# Patient Record
Sex: FEMALE
[De-identification: names, ages, dates, MRNs, and addresses within clinical notes are randomized per-mention and may not be internally consistent; named-entity substitution may affect disease eponyms.]

---

## 2017-06-23 PROBLEM — F41.9 ANXIETY: Status: ACTIVE | Noted: 2017-06-23

## 2018-09-03 ENCOUNTER — HOSPITAL (OUTPATIENT)
Dept: OTHER | Age: 48
End: 2018-09-03
Attending: SURGERY

## 2018-10-09 PROCEDURE — 87045 FECES CULTURE AEROBIC BACT: CPT | Performed by: INTERNAL MEDICINE

## 2018-10-09 PROCEDURE — 87272 CRYPTOSPORIDIUM AG IF: CPT | Performed by: INTERNAL MEDICINE

## 2018-10-09 PROCEDURE — 87046 STOOL CULTR AEROBIC BACT EA: CPT | Performed by: INTERNAL MEDICINE

## 2018-10-09 PROCEDURE — 87427 SHIGA-LIKE TOXIN AG IA: CPT | Performed by: INTERNAL MEDICINE

## 2018-10-09 PROCEDURE — 87329 GIARDIA AG IA: CPT | Performed by: INTERNAL MEDICINE

## 2018-10-09 PROCEDURE — 87209 SMEAR COMPLEX STAIN: CPT | Performed by: INTERNAL MEDICINE

## 2018-10-09 PROCEDURE — 87177 OVA AND PARASITES SMEARS: CPT | Performed by: INTERNAL MEDICINE

## 2018-10-09 PROCEDURE — 89055 LEUKOCYTE ASSESSMENT FECAL: CPT | Performed by: INTERNAL MEDICINE

## 2020-08-20 PROBLEM — Z97.5 IUD (INTRAUTERINE DEVICE) IN PLACE: Status: ACTIVE | Noted: 2020-08-20

## 2020-08-20 PROBLEM — I10 ESSENTIAL HYPERTENSION: Status: ACTIVE | Noted: 2020-08-20

## 2021-06-21 PROBLEM — E78.2 MIXED HYPERLIPIDEMIA: Status: ACTIVE | Noted: 2021-06-21

## 2021-06-21 PROBLEM — E55.9 VITAMIN D DEFICIENCY: Status: ACTIVE | Noted: 2021-06-21

## 2022-01-09 ENCOUNTER — HOSPITAL ENCOUNTER (OUTPATIENT)
Age: 52
Discharge: HOME | End: 2022-01-09
Payer: COMMERCIAL

## 2022-01-09 ENCOUNTER — APPOINTMENT (OUTPATIENT)
Dept: GENERAL RADIOLOGY | Age: 52
End: 2022-01-09
Attending: NURSE PRACTITIONER
Payer: COMMERCIAL

## 2022-01-09 VITALS
HEART RATE: 105 BPM | DIASTOLIC BLOOD PRESSURE: 91 MMHG | TEMPERATURE: 96 F | OXYGEN SATURATION: 98 % | RESPIRATION RATE: 20 BRPM | SYSTOLIC BLOOD PRESSURE: 171 MMHG

## 2022-01-09 DIAGNOSIS — S42.91XA CLOSED FRACTURE OF RIGHT SHOULDER, INITIAL ENCOUNTER: Primary | ICD-10-CM

## 2022-01-09 PROCEDURE — 99204 OFFICE O/P NEW MOD 45 MIN: CPT

## 2022-01-09 PROCEDURE — 73030 X-RAY EXAM OF SHOULDER: CPT | Performed by: NURSE PRACTITIONER

## 2022-01-09 PROCEDURE — 99203 OFFICE O/P NEW LOW 30 MIN: CPT

## 2022-01-09 RX ORDER — HYDROCODONE BITARTRATE AND ACETAMINOPHEN 5; 325 MG/1; MG/1
1-2 TABLET ORAL EVERY 6 HOURS PRN
Qty: 10 TABLET | Refills: 0 | Status: SHIPPED | OUTPATIENT
Start: 2022-01-09 | End: 2022-01-14

## 2022-01-09 RX ORDER — HYDROCODONE BITARTRATE AND ACETAMINOPHEN 5; 325 MG/1; MG/1
1-2 TABLET ORAL EVERY 6 HOURS PRN
Qty: 10 TABLET | Refills: 0 | Status: SHIPPED | OUTPATIENT
Start: 2022-01-09 | End: 2022-01-09

## 2022-01-09 NOTE — ED PROVIDER NOTES
Patient Seen in: [facility]      History   Patient presents with:  Arm or Hand Injury    Stated Complaint: Harish Shuck on ice, injured right arm and shoulder    Subjective:   HPI    This is a well-appearing 79-year-old female who presents with a rig General: She is awake. She is not in acute distress. Appearance: Normal appearance. She is not ill-appearing, toxic-appearing or diaphoretic. HENT:      Head: Normocephalic and atraumatic.       Right Ear: Tympanic membrane, ear canal and external ear (CPT=73030)    Result Date: 1/9/2022  CONCLUSION:  Acute impacted fracture involving the right humeral head-neck. Fracture is moderately comminuted and greater tuberosity fracture fragment is demonstrated with approximate 10 mm peripheral displacement.   D every 6 (six) hours as needed for Pain.   Qty: 10 tablet Refills: 0

## 2022-01-10 PROBLEM — S42.291A OTHER CLOSED DISPLACED FRACTURE OF PROXIMAL END OF RIGHT HUMERUS, INITIAL ENCOUNTER: Status: ACTIVE | Noted: 2022-01-10

## 2022-01-12 ENCOUNTER — LAB ENCOUNTER (OUTPATIENT)
Dept: LAB | Age: 52
End: 2022-01-12
Attending: ORTHOPAEDIC SURGERY
Payer: COMMERCIAL

## 2022-01-12 DIAGNOSIS — Z01.818 PREOP TESTING: ICD-10-CM

## 2022-01-13 LAB — SARS-COV-2 RNA RESP QL NAA+PROBE: NOT DETECTED

## 2022-01-15 ENCOUNTER — ANESTHESIA (OUTPATIENT)
Dept: SURGERY | Facility: HOSPITAL | Age: 52
End: 2022-01-15
Payer: COMMERCIAL

## 2022-01-15 ENCOUNTER — ANESTHESIA EVENT (OUTPATIENT)
Dept: SURGERY | Facility: HOSPITAL | Age: 52
End: 2022-01-15
Payer: COMMERCIAL

## 2022-01-15 ENCOUNTER — HOSPITAL ENCOUNTER (OUTPATIENT)
Facility: HOSPITAL | Age: 52
Setting detail: HOSPITAL OUTPATIENT SURGERY
Discharge: HOME | End: 2022-01-15
Attending: ORTHOPAEDIC SURGERY | Admitting: ORTHOPAEDIC SURGERY
Payer: COMMERCIAL

## 2022-01-15 ENCOUNTER — APPOINTMENT (OUTPATIENT)
Dept: GENERAL RADIOLOGY | Facility: HOSPITAL | Age: 52
End: 2022-01-15
Attending: ORTHOPAEDIC SURGERY
Payer: COMMERCIAL

## 2022-01-15 VITALS
OXYGEN SATURATION: 93 % | BODY MASS INDEX: 36.63 KG/M2 | SYSTOLIC BLOOD PRESSURE: 106 MMHG | DIASTOLIC BLOOD PRESSURE: 60 MMHG | TEMPERATURE: 99 F | WEIGHT: 194 LBS | HEART RATE: 66 BPM | RESPIRATION RATE: 16 BRPM | HEIGHT: 61 IN

## 2022-01-15 DIAGNOSIS — M25.511 ACUTE PAIN OF RIGHT SHOULDER: ICD-10-CM

## 2022-01-15 DIAGNOSIS — S42.291A OTHER CLOSED DISPLACED FRACTURE OF PROXIMAL END OF RIGHT HUMERUS, INITIAL ENCOUNTER: ICD-10-CM

## 2022-01-15 DIAGNOSIS — Z01.818 PREOP TESTING: Primary | ICD-10-CM

## 2022-01-15 LAB — B-HCG UR QL: NEGATIVE

## 2022-01-15 PROCEDURE — 0PSC04Z REPOSITION RIGHT HUMERAL HEAD WITH INTERNAL FIXATION DEVICE, OPEN APPROACH: ICD-10-PCS | Performed by: ORTHOPAEDIC SURGERY

## 2022-01-15 PROCEDURE — 76942 ECHO GUIDE FOR BIOPSY: CPT | Performed by: ORTHOPAEDIC SURGERY

## 2022-01-15 PROCEDURE — 76000 FLUOROSCOPY <1 HR PHYS/QHP: CPT | Performed by: ORTHOPAEDIC SURGERY

## 2022-01-15 PROCEDURE — 0LS30ZZ REPOSITION RIGHT UPPER ARM TENDON, OPEN APPROACH: ICD-10-PCS | Performed by: ORTHOPAEDIC SURGERY

## 2022-01-15 PROCEDURE — 81025 URINE PREGNANCY TEST: CPT

## 2022-01-15 PROCEDURE — 64415 NJX AA&/STRD BRCH PLXS IMG: CPT | Performed by: ORTHOPAEDIC SURGERY

## 2022-01-15 DEVICE — IMPLANTABLE DEVICE: Type: IMPLANTABLE DEVICE | Site: HUMERUS | Status: FUNCTIONAL

## 2022-01-15 DEVICE — SCREW CORT LOCK PURPLE 3.5X26: Type: IMPLANTABLE DEVICE | Site: HUMERUS | Status: FUNCTIONAL

## 2022-01-15 DEVICE — SCREW CORT LOCK PURPLE 3.5X30: Type: IMPLANTABLE DEVICE | Site: HUMERUS | Status: FUNCTIONAL

## 2022-01-15 DEVICE — SCREW CORT LOCK PURPLE 3.5X28: Type: IMPLANTABLE DEVICE | Site: HUMERUS | Status: FUNCTIONAL

## 2022-01-15 DEVICE — SCREW CORT LOCK PURPLE 3.5X44: Type: IMPLANTABLE DEVICE | Site: HUMERUS | Status: FUNCTIONAL

## 2022-01-15 DEVICE — SCREW CORT LOCK PURPLE 3.5X36: Type: IMPLANTABLE DEVICE | Site: HUMERUS | Status: FUNCTIONAL

## 2022-01-15 DEVICE — SCREW CORT LOCK PURPLE 3.5X24: Type: IMPLANTABLE DEVICE | Site: HUMERUS | Status: FUNCTIONAL

## 2022-01-15 RX ORDER — HALOPERIDOL 5 MG/ML
0.25 INJECTION INTRAMUSCULAR ONCE AS NEEDED
Status: DISCONTINUED | OUTPATIENT
Start: 2022-01-15 | End: 2022-01-15

## 2022-01-15 RX ORDER — ROCURONIUM BROMIDE 10 MG/ML
INJECTION, SOLUTION INTRAVENOUS AS NEEDED
Status: DISCONTINUED | OUTPATIENT
Start: 2022-01-15 | End: 2022-01-15 | Stop reason: SURG

## 2022-01-15 RX ORDER — HYDROMORPHONE HYDROCHLORIDE 1 MG/ML
0.6 INJECTION, SOLUTION INTRAMUSCULAR; INTRAVENOUS; SUBCUTANEOUS EVERY 5 MIN PRN
Status: DISCONTINUED | OUTPATIENT
Start: 2022-01-15 | End: 2022-01-15

## 2022-01-15 RX ORDER — CEFAZOLIN SODIUM/WATER 2 G/20 ML
2 SYRINGE (ML) INTRAVENOUS ONCE
Status: DISCONTINUED | OUTPATIENT
Start: 2022-01-15 | End: 2022-01-15 | Stop reason: HOSPADM

## 2022-01-15 RX ORDER — NALOXONE HYDROCHLORIDE 0.4 MG/ML
80 INJECTION, SOLUTION INTRAMUSCULAR; INTRAVENOUS; SUBCUTANEOUS AS NEEDED
Status: DISCONTINUED | OUTPATIENT
Start: 2022-01-15 | End: 2022-01-15

## 2022-01-15 RX ORDER — DEXAMETHASONE SODIUM PHOSPHATE 4 MG/ML
VIAL (ML) INJECTION AS NEEDED
Status: DISCONTINUED | OUTPATIENT
Start: 2022-01-15 | End: 2022-01-15 | Stop reason: SURG

## 2022-01-15 RX ORDER — MIDAZOLAM HYDROCHLORIDE 1 MG/ML
INJECTION INTRAMUSCULAR; INTRAVENOUS AS NEEDED
Status: DISCONTINUED | OUTPATIENT
Start: 2022-01-15 | End: 2022-01-15 | Stop reason: SURG

## 2022-01-15 RX ORDER — HYDROCODONE BITARTRATE AND ACETAMINOPHEN 5; 325 MG/1; MG/1
1 TABLET ORAL AS NEEDED
Status: DISCONTINUED | OUTPATIENT
Start: 2022-01-15 | End: 2022-01-15

## 2022-01-15 RX ORDER — FAMOTIDINE 20 MG/1
20 TABLET ORAL ONCE
Status: DISCONTINUED | OUTPATIENT
Start: 2022-01-15 | End: 2022-01-15 | Stop reason: HOSPADM

## 2022-01-15 RX ORDER — HYDROMORPHONE HYDROCHLORIDE 1 MG/ML
0.4 INJECTION, SOLUTION INTRAMUSCULAR; INTRAVENOUS; SUBCUTANEOUS EVERY 5 MIN PRN
Status: DISCONTINUED | OUTPATIENT
Start: 2022-01-15 | End: 2022-01-15

## 2022-01-15 RX ORDER — HYDROCODONE BITARTRATE AND ACETAMINOPHEN 10; 325 MG/1; MG/1
1 TABLET ORAL EVERY 4 HOURS PRN
Qty: 20 TABLET | Refills: 0 | Status: SHIPPED | OUTPATIENT
Start: 2022-01-15

## 2022-01-15 RX ORDER — MORPHINE SULFATE 10 MG/ML
6 INJECTION, SOLUTION INTRAMUSCULAR; INTRAVENOUS EVERY 10 MIN PRN
Status: DISCONTINUED | OUTPATIENT
Start: 2022-01-15 | End: 2022-01-15

## 2022-01-15 RX ORDER — HYDROCODONE BITARTRATE AND ACETAMINOPHEN 5; 325 MG/1; MG/1
2 TABLET ORAL AS NEEDED
Status: DISCONTINUED | OUTPATIENT
Start: 2022-01-15 | End: 2022-01-15

## 2022-01-15 RX ORDER — SODIUM CHLORIDE, SODIUM LACTATE, POTASSIUM CHLORIDE, CALCIUM CHLORIDE 600; 310; 30; 20 MG/100ML; MG/100ML; MG/100ML; MG/100ML
INJECTION, SOLUTION INTRAVENOUS CONTINUOUS
Status: DISCONTINUED | OUTPATIENT
Start: 2022-01-15 | End: 2022-01-15

## 2022-01-15 RX ORDER — PHENYLEPHRINE HCL 10 MG/ML
VIAL (ML) INJECTION AS NEEDED
Status: DISCONTINUED | OUTPATIENT
Start: 2022-01-15 | End: 2022-01-15 | Stop reason: SURG

## 2022-01-15 RX ORDER — TRANEXAMIC ACID 10 MG/ML
INJECTION, SOLUTION INTRAVENOUS AS NEEDED
Status: DISCONTINUED | OUTPATIENT
Start: 2022-01-15 | End: 2022-01-15 | Stop reason: SURG

## 2022-01-15 RX ORDER — KETOROLAC TROMETHAMINE 30 MG/ML
INJECTION, SOLUTION INTRAMUSCULAR; INTRAVENOUS AS NEEDED
Status: DISCONTINUED | OUTPATIENT
Start: 2022-01-15 | End: 2022-01-15 | Stop reason: SURG

## 2022-01-15 RX ORDER — ONDANSETRON 2 MG/ML
4 INJECTION INTRAMUSCULAR; INTRAVENOUS EVERY 6 HOURS PRN
OUTPATIENT
Start: 2022-01-15

## 2022-01-15 RX ORDER — ONDANSETRON 2 MG/ML
4 INJECTION INTRAMUSCULAR; INTRAVENOUS ONCE AS NEEDED
Status: DISCONTINUED | OUTPATIENT
Start: 2022-01-15 | End: 2022-01-15

## 2022-01-15 RX ORDER — ACETAMINOPHEN 500 MG
1000 TABLET ORAL ONCE
Status: COMPLETED | OUTPATIENT
Start: 2022-01-15 | End: 2022-01-15

## 2022-01-15 RX ORDER — ROPIVACAINE HYDROCHLORIDE 5 MG/ML
INJECTION, SOLUTION EPIDURAL; INFILTRATION; PERINEURAL AS NEEDED
Status: DISCONTINUED | OUTPATIENT
Start: 2022-01-15 | End: 2022-01-15 | Stop reason: SURG

## 2022-01-15 RX ORDER — MORPHINE SULFATE 4 MG/ML
2 INJECTION, SOLUTION INTRAMUSCULAR; INTRAVENOUS EVERY 10 MIN PRN
Status: DISCONTINUED | OUTPATIENT
Start: 2022-01-15 | End: 2022-01-15

## 2022-01-15 RX ORDER — LIDOCAINE HYDROCHLORIDE 10 MG/ML
INJECTION, SOLUTION EPIDURAL; INFILTRATION; INTRACAUDAL; PERINEURAL AS NEEDED
Status: DISCONTINUED | OUTPATIENT
Start: 2022-01-15 | End: 2022-01-15 | Stop reason: SURG

## 2022-01-15 RX ORDER — CEFAZOLIN SODIUM/WATER 2 G/20 ML
SYRINGE (ML) INTRAVENOUS AS NEEDED
Status: DISCONTINUED | OUTPATIENT
Start: 2022-01-15 | End: 2022-01-15 | Stop reason: SURG

## 2022-01-15 RX ORDER — METOPROLOL TARTRATE 5 MG/5ML
2.5 INJECTION INTRAVENOUS ONCE
Status: DISCONTINUED | OUTPATIENT
Start: 2022-01-15 | End: 2022-01-15

## 2022-01-15 RX ORDER — DEXAMETHASONE SODIUM PHOSPHATE 10 MG/ML
INJECTION, SOLUTION INTRAMUSCULAR; INTRAVENOUS AS NEEDED
Status: DISCONTINUED | OUTPATIENT
Start: 2022-01-15 | End: 2022-01-15 | Stop reason: SURG

## 2022-01-15 RX ORDER — PROCHLORPERAZINE EDISYLATE 5 MG/ML
5 INJECTION INTRAMUSCULAR; INTRAVENOUS ONCE AS NEEDED
Status: DISCONTINUED | OUTPATIENT
Start: 2022-01-15 | End: 2022-01-15

## 2022-01-15 RX ORDER — HYDROMORPHONE HYDROCHLORIDE 1 MG/ML
0.2 INJECTION, SOLUTION INTRAMUSCULAR; INTRAVENOUS; SUBCUTANEOUS EVERY 5 MIN PRN
Status: DISCONTINUED | OUTPATIENT
Start: 2022-01-15 | End: 2022-01-15

## 2022-01-15 RX ORDER — ONDANSETRON 2 MG/ML
INJECTION INTRAMUSCULAR; INTRAVENOUS AS NEEDED
Status: DISCONTINUED | OUTPATIENT
Start: 2022-01-15 | End: 2022-01-15 | Stop reason: SURG

## 2022-01-15 RX ORDER — MORPHINE SULFATE 4 MG/ML
4 INJECTION, SOLUTION INTRAMUSCULAR; INTRAVENOUS EVERY 10 MIN PRN
Status: DISCONTINUED | OUTPATIENT
Start: 2022-01-15 | End: 2022-01-15

## 2022-01-15 RX ADMIN — PHENYLEPHRINE HCL 100 MCG: 10 MG/ML VIAL (ML) INJECTION at 07:53:00

## 2022-01-15 RX ADMIN — MIDAZOLAM HYDROCHLORIDE 2 MG: 1 INJECTION INTRAMUSCULAR; INTRAVENOUS at 07:19:00

## 2022-01-15 RX ADMIN — ROPIVACAINE HYDROCHLORIDE 30 ML: 5 INJECTION, SOLUTION EPIDURAL; INFILTRATION; PERINEURAL at 07:25:00

## 2022-01-15 RX ADMIN — TRANEXAMIC ACID 1000 MG: 10 INJECTION, SOLUTION INTRAVENOUS at 08:01:00

## 2022-01-15 RX ADMIN — PHENYLEPHRINE HCL 100 MCG: 10 MG/ML VIAL (ML) INJECTION at 08:07:00

## 2022-01-15 RX ADMIN — KETOROLAC TROMETHAMINE 30 MG: 30 INJECTION, SOLUTION INTRAMUSCULAR; INTRAVENOUS at 09:51:00

## 2022-01-15 RX ADMIN — SODIUM CHLORIDE, SODIUM LACTATE, POTASSIUM CHLORIDE, CALCIUM CHLORIDE: 600; 310; 30; 20 INJECTION, SOLUTION INTRAVENOUS at 07:36:00

## 2022-01-15 RX ADMIN — ONDANSETRON 4 MG: 2 INJECTION INTRAMUSCULAR; INTRAVENOUS at 09:47:00

## 2022-01-15 RX ADMIN — DEXAMETHASONE SODIUM PHOSPHATE 10 MG: 10 INJECTION, SOLUTION INTRAMUSCULAR; INTRAVENOUS at 07:25:00

## 2022-01-15 RX ADMIN — LIDOCAINE HYDROCHLORIDE 50 MG: 10 INJECTION, SOLUTION EPIDURAL; INFILTRATION; INTRACAUDAL; PERINEURAL at 07:44:00

## 2022-01-15 RX ADMIN — CEFAZOLIN SODIUM/WATER 2 G: 2 G/20 ML SYRINGE (ML) INTRAVENOUS at 07:49:00

## 2022-01-15 RX ADMIN — DEXAMETHASONE SODIUM PHOSPHATE 4 MG: 4 MG/ML VIAL (ML) INJECTION at 09:42:00

## 2022-01-15 RX ADMIN — SODIUM CHLORIDE, SODIUM LACTATE, POTASSIUM CHLORIDE, CALCIUM CHLORIDE: 600; 310; 30; 20 INJECTION, SOLUTION INTRAVENOUS at 09:56:00

## 2022-01-15 RX ADMIN — ROCURONIUM BROMIDE 50 MG: 10 INJECTION, SOLUTION INTRAVENOUS at 07:44:00

## 2022-01-15 NOTE — H&P
Teresa Corey  1/17/1970  54year old   female  Robin Best MD     HPI:   Patient presents with:  Right Shoulder - Pain: Right shoulder DOI - 1/8/22 when she fell on the ice.        The patient is left-handed.   Date of injury/ onset of symptoms: 1/8/ AGE 63   • Cancer Maternal Grandmother           breast - unknown age   • Cancer Paternal Grandmother           breast - unknown age       Social History    Tobacco Use      Smoking status: Current Some Day Smoker        Packs/day: 0.50        Years: 15.00 shoulder  (primary encounter diagnosis)  Other closed displaced fracture of proximal end of right humerus, initial encounter     The risks, indications, benefits, and procedures of both operative and non-operative treatment were discussed with the patient.

## 2022-01-15 NOTE — ANESTHESIA PROCEDURE NOTES
Peripheral Block  Performed by: Brennan Fuller MD  Authorized by: Brennan Fuller MD       General Information and Staff    Start Time:  1/15/2022 7:18 AM  End Time:  1/15/2022 7:25 AM  Anesthesiologist:  Brennan Fuller MD  Performed by:  Demetria Mason

## 2022-01-15 NOTE — BRIEF OP NOTE
Pre-Operative Diagnosis: Acute pain of right shoulder [M25.511]  Other closed displaced fracture of proximal end of right humerus, initial encounter [S42.291A]     Post-Operative Diagnosis: Acute pain of right shoulder [M25.511]Other closed displaced fract

## 2022-01-15 NOTE — OR PREOP
Earrings and toe rings removed. Unable to remove belly button ring. Tape placed. Endorsed care to [facility] in PACU for pre op block.  Pippa to update anesthesia and MD.

## 2022-01-15 NOTE — ANESTHESIA PREPROCEDURE EVALUATION
Anesthesia PreOp Note    HPI:     Soraida Kennedy is a 54year old female who presents for preoperative consultation requested by: Jasiel Mock MD    Date of Surgery: 1/15/2022    Procedure(s):  RIGHT PROXIMAL HUMEROUS OPEN REDUCTION INTERNAL FIXATION Oral Tab, TAKE 1 TABLET(20 MG) BY MOUTH TWICE DAILY, Disp: 180 tablet, Rfl: 0, 1/15/2022 at 0500  escitalopram 10 MG Oral Tab, Take 0.5 tablets (5 mg total) by mouth daily. , Disp: 45 tablet, Rfl: 1, 1/12/2022 at Unknown time  hydrochlorothiazide 25 MG Oral Used    Vaping Use      Vaping Use: Never used    Substance and Sexual Activity      Alcohol use:  Yes        Alcohol/week: 10.0 standard drinks        Types: 10 Standard drinks or equivalent per week        Comment: social      Drug use: No      Sexual act Anesthesia Evaluation      Airway   Mallampati: II  TM distance: >3 FB  Neck ROM: full  Dental - normal exam     Pulmonary - normal exam   Cardiovascular - normal exam  (+) hypertension,     Neuro/Psych    (+)  anxiety/panic attacks,  depression,

## 2022-01-15 NOTE — ANESTHESIA POSTPROCEDURE EVALUATION
Patient: Himanshu Bell    Procedure Summary     Date: 01/15/22 Room / Location: [address] [place] [address] MAIN OR 04 / [facility] OR    Anesthesia Start: 3324 Anesthesia Stop:     Procedure: RIGHT PROXIMAL HUMEROUS OPEN REDUCTION INTERNAL FIXATION AND BICEPS TENODESIS (Right Upp

## 2022-01-15 NOTE — ANESTHESIA PROCEDURE NOTES
Airway  Date/Time: 1/15/2022 7:46 AM  Urgency: Elective      General Information and Staff    Patient location during procedure: OR  Anesthesiologist: Jude Escobedo MD  Performed: anesthesiologist     Indications and Patient Condition  Indications fo

## 2022-01-17 NOTE — OPERATIVE REPORT
[facility]    PATIENT'S NAME: Teresa Rosa   ATTENDING PHYSICIAN: Justyna Hill MD   OPERATING PHYSICIAN: Justyna Hill MD   PATIENT ACCOUNT#:   [de-identified]    LOCATION:  [facility] [address] PACU [address] [place] [address]  MEDICAL RECORD #:   [id number] was identified as the correct operative site. My initials were placed. She was transferred to the operating room and transferred to the operating table in the supine position. General anesthesia was induced.   Preoperatively, she received an interscalene with anterior, posterior, oblique, and lateral x-ray views that showed reduction of the fracture, stability of the fracture, and proper length and positioning of the plate and screws.   The wound was then thoroughly and copiously irrigated, and 0 Vicryl winnie

## (undated) DEVICE — KWIRE 2.0

## (undated) DEVICE — SPONGE LAP 18X18IN 7IN LOOP

## (undated) DEVICE — Device

## (undated) DEVICE — SUTURE ETHIBOND 1 CT-1

## (undated) DEVICE — SHOULDER: Brand: MEDLINE INDUSTRIES, INC.

## (undated) DEVICE — DRESSING AQUACEL AG SP 3.5X10

## (undated) DEVICE — ANGIO CATH 16GX5-1/4

## (undated) DEVICE — SUCTION CANISTER, 3000CC,SAFELINER: Brand: DEROYAL

## (undated) DEVICE — INTENT TO BE USED WITH SUTURE MATERIAL FOR TISSUE CLOSURE: Brand: RICHARD-ALLAN® NEEDLE 3/8 CIRCLE TROCAR

## (undated) DEVICE — WEBRIL COTTON UNDERCAST PADDING: Brand: WEBRIL

## (undated) DEVICE — CAST GRN 5YDX4IN GPSN S PLSTR

## (undated) DEVICE — 3M™ MEDITPORE™ SOFT CLOTH TAPE 6 IN X 10 YD 12 ROLLS/CASE 2966: Brand: 3M™ MEDIPORE™

## (undated) DEVICE — STERILE TETRA-FLEX CF, ELASTIC BANDAGEE, 3" X 5.5YD: Brand: TETRA-FLEX™CF

## (undated) DEVICE — CHLORAPREP 26ML APPLICATOR

## (undated) DEVICE — STANDARD HYPODERMIC NEEDLE,POLYPROPYLENE HUB: Brand: MONOJECT

## (undated) DEVICE — DRILL BIT 2.7MM 2142-27-070

## (undated) DEVICE — PRECISION (7.0 X 0.51 X 29.5MM)

## (undated) DEVICE — COTTON UNDERCAST PADDING,REGULAR FINISH: Brand: WEBRIL

## (undated) DEVICE — GAUZE SPONGES,12 PLY: Brand: CURITY

## (undated) DEVICE — SUTURE VICRYL 2-0 FS-1

## (undated) DEVICE — SUTURE ETHILON 3-0 669H

## (undated) DEVICE — GAMMEX® PI HYBRID SIZE 6.5, STERILE POWDER-FREE SURGICAL GLOVE, POLYISOPRENE AND NEOPRENE BLEND: Brand: GAMMEX

## (undated) DEVICE — 3M™ STERI-STRIP™ REINFORCED ADHESIVE SKIN CLOSURES, R1547, 1/2 IN X 4 IN (12 MM X 100 MM), 6 STRIPS/ENVELOPE: Brand: 3M™ STERI-STRIP™

## (undated) DEVICE — 12 ML SYRINGE LUER-LOCK TIP: Brand: MONOJECT

## (undated) DEVICE — CONMED ACCESSORY ELECTRODE, NEEDLE ELECTRODE

## (undated) DEVICE — STERILE TETRA-FLEX CF, ELASTIC BANDAGE, 4" X 5.5YD: Brand: TETRA-FLEX™CF

## (undated) DEVICE — 3M™ IOBAN™ 2 ANTIMICROBIAL INCISE DRAPE 6650EZ: Brand: IOBAN™ 2

## (undated) DEVICE — SUTURE MONOCRYL 3-0 Y936H

## (undated) DEVICE — GAMMEX® NON-LATEX PI ORTHO SIZE 7, STERILE POLYISOPRENE POWDER-FREE SURGICAL GLOVE: Brand: GAMMEX

## (undated) DEVICE — 3M™ STERI-STRIP™ COMPOUND BENZOIN TINCTURE 40 BAGS/CARTON 4 CARTONS/CASE C1544: Brand: 3M™ STERI-STRIP™

## (undated) DEVICE — FIBERWIRE 2.0 NEEDLE AR-7223

## (undated) DEVICE — C-ARM: Brand: UNBRANDED

## (undated) DEVICE — SOL  .9 1000ML BTL

## (undated) DEVICE — ABDOMINAL PAD: Brand: CURITY

## (undated) DEVICE — SUTURE VICRYL 0 CP-1

## (undated) DEVICE — STERILE TETRA-FLEX CF, ELASTIC BANDAGE LATEX FREE 6IN X5.5 YD: Brand: TETRA-FLEX™CF